# Patient Record
Sex: FEMALE | Race: WHITE
[De-identification: names, ages, dates, MRNs, and addresses within clinical notes are randomized per-mention and may not be internally consistent; named-entity substitution may affect disease eponyms.]

---

## 2017-05-11 NOTE — US
First trimester obstetrical ultrasound: Multiple real-time images were

 obtained transabdominally.

 

Comparison: No previous studies available.

 

Dates:

LMP: LMP given as 2/22/17, SHANDRA 11/29/17, gestational age 11 weeks 1 

day

Current ultrasound: SHANDRA 11/25/17, gestational age 11 weeks 5 days

 

Single intrauterine gestation is seen.  Amniotic fluid volume is 

normal.  Embryo is noted.  Right and left ovaries are unremarkable.  

Clump of material next to the gestational sac most likely representing

 blood clot within the lower uterine segment likely representing 

subchorionic hemorrhage which occurs near the internal cervical os.

 

Maternal adnexa: Unremarkable

 

Measurements:

Crown-rump length: 49.58 mm - 11 weeks 5 days

Heart rate: 167 BPM

Cervical length: 3.7 cm

 

Impression:

1.  Findings felt compatible with subchorionic hemorrhage/blood clot 

within the lower uterine segment next to the internal cervical os.

2.  Single intrauterine gestation is seen with dates as noted above.

3.  Normal heart activity is seen at this time.

 

Diagnostic code #3

## 2017-11-27 NOTE — PCM.LDHP
L&D History of Present Illness





- General


Date of Service: 17


Admit Problem/Dx: 


 Patient Status Order with Admit Dx/Problem





17 07:30


Patient Status [ADT] Routine 








 Admission Diagnosis/Problem











Admission Diagnosis/Problem    Normal labor














Source of Information: Patient


History Limitations: Reports: No Limitations





- History of Present Illness


Introduction:: 





patient is a 27-year-old  at 39-5/7 weeks gestation who presentsday in 

labor.  States that se has been lana irregularly on and off for the 

pastbut these became consistent about 2:00 this morning.  Denies any loss of 

fluid.  





- Related Data


Allergies/Adverse Reactions: 


 Allergies











Allergy/AdvReac Type Severity Reaction Status Date / Time


 


No Known Allergies Allergy   Verified 17 17:19











Home Medications: 


 Home Meds





L.acidoph,Paracasei, B.lactis [Probiotic] 1 tab PO DAILY 17 [History]


Multivitamin [Multivitamins] 1 tab PO DAILY 17 [History]


Propranolol [Inderal] 10 mg PO BID 17 [History]











Past Medical History


OB/GYN History: Reports: Pregnancy


: 3


Para: 2


LMP (Approximate): Pregnant


Neurological History: Reports: Headaches, Chronic





- Past Surgical History


HEENT Surgical History: Reports: Tonsillectomy


GI Surgical History: Reports: Cholecystectomy, Colonoscopy, EGD


Female  Surgical History: Reports:  Section





Social & Family History





- Tobacco Use


Smoking Status *Q: Never Smoker





- Caffeine Use


Caffeine Use: Reports: Coffee





- Alcohol Use


Alcohol Use History: No





- Recreational Drug Use


Recreational Drug Use: No





H&P Review of Systems





- Review of Systems:


Review Of Systems: See Below


General: Reports: No Symptoms


Pulmonary: Reports: No Symptoms


Cardiovascular: Reports: No Symptoms


Gastrointestinal: Reports: Abdominal Pain (contractions)


Genitourinary: Reports: No Symptoms


Musculoskeletal: Reports: No Symptoms


Psychiatric: Reports: No Symptoms





L&D Exam





- Exam


Exam: See Below





- OB Specific


Contraction Intensity: Moderate to Strong


Fetal Movement: Active


Fetal Heart Tones: Present


Fetal Heart Tones per Min: 135


Fetal Heart Rate (FHR) Variability: Moderate (6-25 bmp)


Birth Presentation: Vertex





- Garcia Score


Garcia Score Cervix Position: Midposition


Garcia Score Effacement: >80%


Garcia Score Dilation: > 5 cm


Garcia Score Infant's Station: -2





- Exam


General: Alert, Oriented, Cooperative


Lungs: Clear to Auscultation, Normal Respiratory Effort


Cardiovascular: Regular Rate, Regular Rhythm


GI/Abdominal Exam: Soft, Non-Tender


Genitourinary: Normal external exam


Extremities: Normal Inspection


Skin: Warm, Dry, Intact





- Patient Data


Result Diagrams: 


 17 07:51








- Problem List


(1) 39 weeks gestation of pregnancy


SNOMED Code(s): 51124514


   ICD Code: Z3A.39 - 39 WEEKS GESTATION OF PREGNANCY   Status: Acute   Current 

Visit: Yes   





(2) History of 


SNOMED Code(s): 154119467


   ICD Code: Z98.891 - HISTORY OF UTERINE SCAR FROM PREVIOUS SURGERY   Status: 

Acute   Current Visit: Yes   





(3) History of 


SNOMED Code(s): 796022506


   ICD Code: Z98.891 - HISTORY OF UTERINE SCAR FROM PREVIOUS SURGERY   Status: 

Acute   Current Visit: Yes   





(4) Normal labor


SNOMED Code(s): 41840955


   ICD Code: O80 - ENCOUNTER FOR FULL-TERM UNCOMPLICATED DELIVERY; Z37.9 - 

OUTCOME OF DELIVERY, UNSPECIFIED   Status: Acute   Current Visit: Yes   





(5) Desires  (vaginal birth after ) trial


SNOMED Code(s): 987571282


   ICD Code: O34.219 - MATERNAL CARE FOR UNSP TYPE SCAR FROM PREVIOUS  

DEL   Status: Acute   Current Visit: Yes   


Problem List Initiated/Reviewed/Updated: Yes


Orders Last 24hrs: 


 Active Orders 24 hr











 Category Date Time Status


 


 Patient Status [ADT] Routine ADT  17 07:30 Ordered


 


 Activity as Tolerated [RC] PFP Care  17 07:30 Ordered


 


 Communication Order [RC] ASDIRECTED Care  17 07:30 Ordered


 


 Fetal Heart Tones [RC] ASDIRECTED Care  17 07:30 Ordered


 


 Notify Provider [RC] PFP Care  17 07:30 Ordered


 


 Notify Provider [RC] PRN Care  17 07:30 Ordered


 


 Peripheral IV Care [RC] .AS DIRECTED Care  17 07:30 Ordered


 


 Vital Signs [RC] PER UNIT ROUTINE Care  17 07:30 Ordered


 


 Clear Liquid Diet [DIET] Diet  17 Breakfast Ordered


 


 CBC W/O DIFF,HEMOGRAM [HEME] Stat Lab  17 07:29 Ordered


 


 TYPE AND SCREEN [BBK] Stat Lab  17 07:29 Ordered


 


 Lactated Ringers [Ringers, Lactated] 1,000 ml Med  17 07:30 Ordered





 IV ASDIRECTED   


 


 Nalbuphine [Nubain] Med  17 07:29 Ordered





 10 mg IVPUSH Q2H PRN   


 


 Ondansetron [Zofran] Med  17 07:29 Ordered





 4 mg IVPUSH Q4H PRN   


 


 Oxytocin/Lactated Ringers [Pitocin in LR 10 Units/1,000 Med  17 07:30 

Ordered





 ML]   





 10 unit in 1,000 ml IV .CONTINUOUS   


 


 Sodium Chloride 0.9% [Saline Flush] Med  17 07:29 Ordered





 10 ml FLUSH ASDIRECTED PRN   


 


 Electronic Fetal Heart Tones Ext w TOCO [WOMSER] Oth  17 07:30 Ordered





 Routine   


 


 Electronic Fetal Heart Tones Internal [WOMSER] Per Unit Oth  17 07:30 

Ordered





 Routine   


 


 Peripheral IV Insertion Adult [OM.PC] Routine Oth  17 07:30 Ordered


 


 Resuscitation Status Routine Resus Stat  17 07:29 Ordered











Assessment/Plan Comment:: 





27-year-old  at 39-5/7 weeks gestation presents in labor.  She has a 

history of a  for breech and then a successful .  Does desire  

again this pregnancy,





* CBC and type and screen


* GBS negative, no need for antibiotics


* pain management per patient preference


* Previously counseled on risks and benefits of 


* Anticipate vaginal delivery

## 2017-11-27 NOTE — PCM.PREANE
Preanesthetic Assessment





- Anesthesia/Transfusion/Family Hx


Anesthesia History: Prior Anesthesia Without Reaction


Family History of Anesthesia Reaction: No


Transfusion History: No Prior Transfusion(s)





- Review of Systems


General: No Symptoms


Pulmonary: No Symptoms


Cardiovascular: No Symptoms


Gastrointestinal: No Symptoms


Neurological: No Symptoms


Other: Reports: None





- Physical Assessment


Pulse: 96


O2 Sat by Pulse Oximetry: 97


Respiratory Rate: 22


Blood Pressure: 145/60


Temperature: 36.3 C


Height: 1.65 m


Weight: 118.887 kg


ASA Class: 2


Mental Status: Alert & Oriented x3


Airway Class: Mallampati = 1


Dentition: Reports: Normal Dentition


Thyro-Mental Finger Breadths: 3


Mouth Opening Finger Breadths: 3


ROM/Head Extension: Full


Lungs: Clear to Auscultation, Normal Respiratory Effort


Cardiovascular: Regular Rate, Regular Rhythm





- Lab


Values: 





 Laboratory Last Values











WBC  11.48 K/mm3 (3.98-10.04)  H  17  07:51    


 


RBC  4.84 M/mm3 (3.98-5.22)   17  07:51    


 


Hgb  13.4 gm/L (11.2-15.7)   17  07:51    


 


Hct  39.5 % (34.1-44.9)   17  07:51    


 


MCV  81.6 fl (79.4-94.8)   17  07:51    


 


MCH  27.7 pg (25.6-32.2)   17  07:51    


 


MCHC  33.9 g/dl (32.2-35.5)   17  07:51    


 


RDW Std Deviation  43.1 fL (36.4-46.3)   17  07:51    


 


Plt Count  219 K/mm3 (182-369)   17  07:51    


 


MPV  11.3 fl (9.4-12.3)   17  07:51    














- Allergies


Allergies/Adverse Reactions: 


 Allergies











Allergy/AdvReac Type Severity Reaction Status Date / Time


 


No Known Allergies Allergy   Verified 17 17:19














- Anesthesia Plan


Pre-Op Medication Ordered: None





- Acknowledgements


Anesthesia Type Planned: Epidural


Pt an Appropriate Candidate for the Planned Anesthesia: Yes


Alternatives and Risks of Anesthesia Discussed w Pt/Guardian: Yes


Pt/Guardian Understands and Agrees with Anesthesia Plan: Yes





PreAnesthesia Questionnaire


Gastrointestinal History: Reports: GERD


OB/GYN History: Reports: Pregnancy


Other OB/BYN History: 


Neurological History: Reports: Headaches, Chronic





- Past Surgical History


HEENT Surgical History: Reports: Tonsillectomy


GI Surgical History: Reports: Cholecystectomy, Colonoscopy, EGD


Female  Surgical History: Reports:  Section





- SUBSTANCE USE


Smoking Status *Q: Never Smoker


Recreational Drug Use History: No





- HOME MEDS


Home Medications: 


 Home Meds





L.acidoph,Paracasei, B.lactis [Probiotic] 1 tab PO DAILY 17 [History]


Multivitamin [Multivitamins] 1 tab PO DAILY 17 [History]


Propranolol [Inderal] 10 mg PO BID 17 [History]











- CURRENT (IN HOUSE) MEDS


Current Meds: 





 Current Medications





Diphenhydramine HCl (Benadryl)  25 mg IVPUSH Q6H PRN


   PRN Reason: Itching


Ephedrine Sulfate (Ephedrine Sulfate)  5 mg IVPUSH ASDIRECTED PRN


   PRN Reason: HYPOTENTSION


Fentanyl (Sublimaze)  100 mcg EPIDUR Q3H PRN


   PRN Reason: PAIN


Fentanyl/Bupivacaine HCl (Fentanyl/Bupivacaine/Ns 2 Mcg-0.125% 100 Ml)  100 ml 

EPIDUR ASDIRECTED GENESIS


Lactated Ringer's (Ringers, Lactated)  1,000 mls @ 100 mls/hr IV ASDIRECTED GENESIS


   Last Admin: 17 08:10 Dose:  999 mls/hr


Oxytocin/Lactated Ringer's (Pitocin In Lr 10 Units/1,000 Ml)  10 unit in 1,000 

mls @ 500 mls/hr IV .CONTINUOUS GENESIS


Nalbuphine HCl (Nubain)  10 mg IVPUSH Q2H PRN


   PRN Reason: Pain (moderate 4-6)


Ondansetron HCl (Zofran)  4 mg IVPUSH Q4H PRN


   PRN Reason: Nausea/Vomiting


Sodium Chloride (Saline Flush)  10 ml FLUSH ASDIRECTED PRN


   PRN Reason: Keep Vein Open

## 2017-11-27 NOTE — PCM.DEL
L & D Note





- General Info


Date of Service: 17





- Delivery Note


Labor: Spontaneous


Delivery Outcome: Livebirth


Infant Delivery Method: Spontaneous Vaginal Delivery-Single


Infant Delivery Mode: Spontaneous


Birth Presentation: Right Occiput Anterior (KEO)


Nuchal Cord: None


Anesthesia Type: Epidural


Amniotic Fluid Description: Clear


Episiotomy Type: None


Laceration: 2nd Degree, Perineal


Suture type: Vicryl


Suture size: 2-0


Placenta: Intact, Spontaneous


Cord: 3 Vessels


Estimated Blood Loss: 350


Resuscitation Needed: Yes


[place]: Bulb Syringe, Stimulated, Warmed, [place] Used


Delivery Comments (Free Text/Narrative):: 


Patient found to be complete and began pushing.  With maternal pushing effort 

fetal head delivered from an KEO presentation.  No nuchal cord present.  With 

gentle traction the fetal shoulders and body delivered.  Infant placed on 

maternal abdomen.  Cord clamped and cut.  Cord blood obtained.  Placenta 

allowed time to separate and spontaneously expelled.  Inspection of the 

perineum showed a second-degree laceration which was repaired with a 2-0 Vicryl 

in the typical fashion.








- Patient Data


Vitals - Most Recent: 


 Last Vital Signs











Temp  36.3 C   17 09:01


 


Pulse  96   17 09:01


 


Resp  22 H  17 09:01


 


BP  145/60 H  17 09:01


 


Pulse Ox  97   17 09:01











Weight - Most Recent: 118.887 kg


Lab Results Last 24 Hours: 


 Laboratory Results - last 24 hr











  17 Range/Units





  07:51 08:24 


 


WBC  11.48 H   (3.98-10.04)  K/mm3


 


RBC  4.84   (3.98-5.22)  M/mm3


 


Hgb  13.4   (11.2-15.7)  gm/L


 


Hct  39.5   (34.1-44.9)  %


 


MCV  81.6   (79.4-94.8)  fl


 


MCH  27.7   (25.6-32.2)  pg


 


MCHC  33.9   (32.2-35.5)  g/dl


 


RDW Std Deviation  43.1   (36.4-46.3)  fL


 


Plt Count  219   (182-369)  K/mm3


 


MPV  11.3   (9.4-12.3)  fl


 


Blood Type   AB POSITIVE  


 


Gel Antibody Screen   Negative  











Med Orders - Current: 


 Current Medications





Diphenhydramine HCl (Benadryl)  25 mg IVPUSH Q6H PRN


   PRN Reason: Itching


Ephedrine Sulfate (Ephedrine Sulfate)  5 mg IVPUSH ASDIRECTED PRN


   PRN Reason: HYPOTENTSION


Fentanyl (Sublimaze)  100 mcg EPIDUR Q3H PRN


   PRN Reason: PAIN


   Last Admin: 17 09:16 Dose:  100 mcg


Fentanyl/Bupivacaine HCl (Fentanyl/Bupivacaine/Ns 2 Mcg-0.125% 100 Ml)  100 ml 

EPIDUR ASDIRECTED GENESIS


   Last Admin: 17 09:15 Dose:  100 ml


Lactated Ringer's (Ringers, Lactated)  1,000 mls @ 100 mls/hr IV ASDIRECTED GENESIS


   Last Admin: 17 09:10 Dose:  125 mls/hr


Oxytocin/Lactated Ringer's (Pitocin In Lr 10 Units/1,000 Ml)  10 unit in 1,000 

mls @ 500 mls/hr IV .CONTINUOUS [facility]


   Last Admin: 17 11:23 Dose:  999 mls/hr


Nalbuphine HCl (Nubain)  10 mg IVPUSH Q2H PRN


   PRN Reason: Pain (moderate 4-6)


Ondansetron HCl (Zofran)  4 mg IVPUSH Q4H PRN


   PRN Reason: Nausea/Vomiting


Sodium Chloride (Saline Flush)  10 ml FLUSH ASDIRECTED PRN


   PRN Reason: Keep Vein Open











- Problem List & Annotations


(1) 39 weeks gestation of pregnancy


SNOMED Code(s): 69039457


   Code(s): Z3A.39 - 39 WEEKS GESTATION OF PREGNANCY   Status: Acute   Current 

Visit: Yes   





(2) History of 


SNOMED Code(s): 782998386


   Code(s): Z98.891 - HISTORY OF UTERINE SCAR FROM PREVIOUS SURGERY   Status: 

Acute   Current Visit: Yes   





(3) History of 


SNOMED Code(s): 074269085


   Code(s): Z98.891 - HISTORY OF UTERINE SCAR FROM PREVIOUS SURGERY   Status: 

Acute   Current Visit: Yes   





(4) Normal labor


SNOMED Code(s): 66508395


   Code(s): O80 - ENCOUNTER FOR FULL-TERM UNCOMPLICATED DELIVERY; Z37.9 - 

OUTCOME OF DELIVERY, UNSPECIFIED   Status: Acute   Current Visit: Yes   





(5) Desires  (vaginal birth after ) trial


SNOMED Code(s): 597184838


   Code(s): O34.219 - MATERNAL CARE FOR UNSP TYPE SCAR FROM PREVIOUS  

DEL   Status: Acute   Current Visit: Yes   





(6) , delivered, current hospitalization


SNOMED Code(s): 095923208


   Code(s): O34.219 - MATERNAL CARE FOR UNSP TYPE SCAR FROM PREVIOUS  

DEL   Status: Acute   Current Visit: Yes   





- Problem List Review


Problem List Initiated/Reviewed/Updated: Yes





- My Orders


Last 24 Hours: 


My Active Orders





17 07:29


Nalbuphine [Nubain]   10 mg IVPUSH Q2H PRN 


Ondansetron [Zofran]   4 mg IVPUSH Q4H PRN 


Sodium Chloride 0.9% [Saline Flush]   10 ml FLUSH ASDIRECTED PRN 


Resuscitation Status Routine 





17 07:30


Patient Status [ADT] Routine 


Activity as Tolerated [RC] PFP 


Communication Order [RC] ASDIRECTED 


Fetal Heart Tones [RC] ASDIRECTED 


Notify Provider [RC] PFP 


Notify Provider [RC] PRN 


Vital Signs [RC] PER UNIT ROUTINE 


Lactated Ringers [Ringers, Lactated] 1,000 ml IV ASDIRECTED 


Oxytocin/Lactated Ringers [Pitocin in LR 10 Units/1,000 ML] 10 unit in 1,000 ml 

IV .CONTINUOUS 


Electronic Fetal Heart Tones Ext w TOCO [WOMSER] Routine 


Electronic Fetal Heart Tones Internal [WOMSER] Per Unit Routine 


Peripheral IV Insertion Adult [OM.PC] Routine 





17 Breakfast


Clear Liquid Diet [DIET] 














- Assessment


Assessment:: 





26 y/o G3 now  PPD#0 from  at 39 5/7 wks 





- Plan


Plan:: 





/


* Routine cares


* Encourage breast feeding


* Discharge home in 1-2 days

## 2017-11-28 NOTE — PCM.[email or web address]
**Discharge Summary





- Discharge Data


Discharge Date: 17


Discharge Disposition: Home, Self-Care 01


Condition: Good





- Discharge Diagnosis/Problem(s)


(1) 39 weeks gestation of pregnancy


SNOMED Code(s): 73012866


   ICD Code: Z3A.39 - 39 WEEKS GESTATION OF PREGNANCY   Status: Acute   





(2) History of 


SNOMED Code(s): 324096302


   ICD Code: Z98.891 - HISTORY OF UTERINE SCAR FROM PREVIOUS SURGERY   Status: 

Acute   





(3) History of 


SNOMED Code(s): 173206462


   ICD Code: Z98.891 - HISTORY OF UTERINE SCAR FROM PREVIOUS SURGERY   Status: 

Acute   





(4) Normal labor


SNOMED Code(s): 43586502


   ICD Code: O80 - ENCOUNTER FOR FULL-TERM UNCOMPLICATED DELIVERY; Z37.9 - 

OUTCOME OF DELIVERY, UNSPECIFIED   Status: Acute   





(5) Desires  (vaginal birth after ) trial


SNOMED Code(s): 534897981


   ICD Code: O34.219 - MATERNAL CARE FOR UNSP TYPE SCAR FROM PREVIOUS  

DEL   Status: Acute   





(6) , delivered, current hospitalization


SNOMED Code(s): 323760922


   ICD Code: O34.219 - MATERNAL CARE FOR UNSP TYPE SCAR FROM PREVIOUS  

DEL   Status: Acute   





- Patient Summary/Data


Complications: None


Consults: 


None


Recommended Follow-up Testing/Procedures: 


Follow up in 5-6 weeks for postpartum check 


Hospital Course: 





28 y/o  presented at 39 5/7 wks in labor.  She progressed well to 

complete dilation without need for augmentation.  She underwent an 

uncomplicated .  See delivery note.  Postpartum she did well and was 

discharged home on PPD#1





- Patient Instructions


Diet: Regular Diet as Tolerated


Activity: As Tolerated


Activity, Other: Pelvic Rest for 6 weeks


Driving: May Drive Today


Showering/Bathing: May Shower


Showering/Bathing, Other: May Bathe


Notify Provider of: Fever, Increased Pain, Swelling and Redness, Drainage, 

Nausea and/or Vomiting





- Discharge Plan


Home Medications: 


 Home Meds





L.acidoph,Paracasei, B.lactis [Probiotic] 1 tab PO DAILY 17 [History]


Pnv No.122/Iron/Folic Acid [Prenatal Multi Tablet] 1 each PO DAILY 17 [

History]


Docusate Sodium [Colace] 100 mg PO BID PRN  cap 17 [Rx]


Ibuprofen [IJD: Ibuprofen] 600 mg PO Q6H PRN  tablet 17 [Rx]








Patient Handouts:  Home Care Instructions for Mom


Referrals: 


Denisha Osei MD [Primary Care Provider] -  (5-6 weeks for postpartum check)





- Discharge Summary/Plan Comment


DC Time >30 min.: No





- Patient Data


Vitals - Most Recent: 


 Last Vital Signs











Temp  36.7 C   17 03:52


 


Pulse  84   17 03:52


 


Resp  18   17 03:52


 


BP  126/65   17 03:52


 


Pulse Ox  97   17 09:01











Weight - Most Recent: 118.887 kg


Lab Results - Last 24 hrs: 


 Laboratory Results - last 24 hr











  17 Range/Units





  07:51 08:24 


 


WBC  11.48 H   (3.98-10.04)  K/mm3


 


RBC  4.84   (3.98-5.22)  M/mm3


 


Hgb  13.4   (11.2-15.7)  gm/L


 


Hct  39.5   (34.1-44.9)  %


 


MCV  81.6   (79.4-94.8)  fl


 


MCH  27.7   (25.6-32.2)  pg


 


MCHC  33.9   (32.2-35.5)  g/dl


 


RDW Std Deviation  43.1   (36.4-46.3)  fL


 


Plt Count  219   (182-369)  K/mm3


 


MPV  11.3   (9.4-12.3)  fl


 


Blood Type   AB POSITIVE  


 


Gel Antibody Screen   Negative  











Med Orders - Current: 


 Current Medications





Acetaminophen (Tylenol)  650 mg PO Q4H PRN


   PRN Reason: mild pain or fever


Benzocaine/Menthol (Dermoplast Pain Relief Spray)  0 gm TOP ASDIRECTED PRN


   PRN Reason: Perineal Comfort Measure


   Last Admin: 17 13:07 Dose:  1 can


Docusate Sodium (Colace)  100 mg PO BID PRN


   PRN Reason: Constipation


   Last Admin: 17 13:07 Dose:  100 mg


Emollient Ointment (Lansinoh Hpa)  0 gm TOP ASDIRECTED PRN


   PRN Reason: Sore Nipples


Ibuprofen (Motrin)  600 mg PO Q6H PRN


   PRN Reason: Mild pain or fever


   Last Admin: 17 01:41 Dose:  600 mg


Witch Hazel (Tucks)  1 pad TOP ASDIRECTED PRN


   PRN Reason: Hemorrhoid pain


   Last Admin: 17 13:07 Dose:  1 container





Discontinued Medications





Diphenhydramine HCl (Benadryl)  25 mg IVPUSH Q6H PRN


   PRN Reason: Itching


Ephedrine Sulfate (Ephedrine Sulfate)  5 mg IVPUSH ASDIRECTED PRN


   PRN Reason: HYPOTENTSION


Fentanyl (Sublimaze)  100 mcg EPIDUR Q3H PRN


   PRN Reason: PAIN


   Last Admin: 17 09:16 Dose:  100 mcg


Fentanyl/Bupivacaine HCl (Fentanyl/Bupivacaine/Ns 2 Mcg-0.125% 100 Ml)  100 ml 

EPIDUR ASDIRECTED GENESIS


   Last Admin: 17 09:15 Dose:  100 ml


Lactated Ringer's (Ringers, Lactated)  1,000 mls @ 100 mls/hr IV ASDIRECTED GENESIS


   Last Admin: 17 09:10 Dose:  125 mls/hr


Oxytocin/Lactated Ringer's (Pitocin In Lr 10 Units/1,000 Ml)  10 unit in 1,000 

mls @ 500 mls/hr IV .CONTINUOUS GENESIS


   Last Admin: 17 11:23 Dose:  999 mls/hr


Nalbuphine HCl (Nubain)  10 mg IVPUSH Q2H PRN


   PRN Reason: Pain (moderate 4-6)


Ondansetron HCl (Zofran)  4 mg IVPUSH Q4H PRN


   PRN Reason: Nausea/Vomiting


Sodium Chloride (Saline Flush)  10 ml FLUSH ASDIRECTED PRN


   PRN Reason: Keep Vein Open











*Q Meaningful Use (DIS)





- VTE *Q


VTE Criteria *Q: 








- Stroke *Q


Stroke Criteria *Q: 








- AMI *Q


AMI Criteria *Q:

## 2017-11-28 NOTE — PCM.PNPP
- General Info


Date of Service: 17


Functional Status: Reports: Pain Controlled, Tolerating Diet, Ambulating, 

Urinating





- Review of Systems


General: Reports: No Symptoms


Pulmonary: Reports: No Symptoms


Cardiovascular: Reports: No Symptoms


Gastrointestinal: Reports: No Symptoms


Genitourinary: Reports: No Symptoms


Musculoskeletal: Reports: No Symptoms





- Patient Data


Vital Signs - Most Recent: 


 Last Vital Signs











Temp  36.7 C   17 03:52


 


Pulse  84   17 03:52


 


Resp  18   17 03:52


 


BP  126/65   17 03:52


 


Pulse Ox  97   17 09:01











Weight - Most Recent: 118.887 kg


Lab Results - Last 24 Hours: 


 Laboratory Results - last 24 hr











  17 Range/Units





  07:51 08:24 


 


WBC  11.48 H   (3.98-10.04)  K/mm3


 


RBC  4.84   (3.98-5.22)  M/mm3


 


Hgb  13.4   (11.2-15.7)  gm/L


 


Hct  39.5   (34.1-44.9)  %


 


MCV  81.6   (79.4-94.8)  fl


 


MCH  27.7   (25.6-32.2)  pg


 


MCHC  33.9   (32.2-35.5)  g/dl


 


RDW Std Deviation  43.1   (36.4-46.3)  fL


 


Plt Count  219   (182-369)  K/mm3


 


MPV  11.3   (9.4-12.3)  fl


 


Blood Type   AB POSITIVE  


 


Gel Antibody Screen   Negative  











Med Orders - Current: 


 Current Medications





Acetaminophen (Tylenol)  650 mg PO Q4H PRN


   PRN Reason: mild pain or fever


Benzocaine/Menthol (Dermoplast Pain Relief Spray)  0 gm TOP ASDIRECTED PRN


   PRN Reason: Perineal Comfort Measure


   Last Admin: 17 13:07 Dose:  1 can


Docusate Sodium (Colace)  100 mg PO BID PRN


   PRN Reason: Constipation


   Last Admin: 17 13:07 Dose:  100 mg


Emollient Ointment (Lansinoh Hpa)  0 gm TOP ASDIRECTED PRN


   PRN Reason: Sore Nipples


Ibuprofen (Motrin)  600 mg PO Q6H PRN


   PRN Reason: Mild pain or fever


   Last Admin: 17 01:41 Dose:  600 mg


Witch Hazel (Tucks)  1 pad TOP ASDIRECTED PRN


   PRN Reason: Hemorrhoid pain


   Last Admin: 17 13:07 Dose:  1 container





Discontinued Medications





Diphenhydramine HCl (Benadryl)  25 mg IVPUSH Q6H PRN


   PRN Reason: Itching


Ephedrine Sulfate (Ephedrine Sulfate)  5 mg IVPUSH ASDIRECTED PRN


   PRN Reason: HYPOTENTSION


Fentanyl (Sublimaze)  100 mcg EPIDUR Q3H PRN


   PRN Reason: PAIN


   Last Admin: 17 09:16 Dose:  100 mcg


Fentanyl/Bupivacaine HCl (Fentanyl/Bupivacaine/Ns 2 Mcg-0.125% 100 Ml)  100 ml 

EPIDUR ASDIRECTED GENESIS


   Last Admin: 17 09:15 Dose:  100 ml


Lactated Ringer's (Ringers, Lactated)  1,000 mls @ 100 mls/hr IV ASDIRECTED GENESIS


   Last Admin: 17 09:10 Dose:  125 mls/hr


Oxytocin/Lactated Ringer's (Pitocin In Lr 10 Units/1,000 Ml)  10 unit in 1,000 

mls @ 500 mls/hr IV .CONTINUOUS GENESIS


   Last Admin: 17 11:23 Dose:  999 mls/hr


Nalbuphine HCl (Nubain)  10 mg IVPUSH Q2H PRN


   PRN Reason: Pain (moderate 4-6)


Ondansetron HCl (Zofran)  4 mg IVPUSH Q4H PRN


   PRN Reason: Nausea/Vomiting


Sodium Chloride (Saline Flush)  10 ml FLUSH ASDIRECTED PRN


   PRN Reason: Keep Vein Open











- Infant Interaction


Infant Disposition, Postpartum: [place] in Room with Family


Infant Interaction: Holding Infant


Infant Feeding:  Infant; Nursed Well


Support Person: 





- Postpartum Recovery Exam


Fundal Tone: Firm


Fundal Level: At Umbilicus


Fundal Placement: Right


Lochia Amount: None, Moderate


Lochia Color: Rubra/Red


Perineum Description: Intact, Minimal Bruising/Swelling


Other Perinuem Description: 2nd degree laceration with repair


Episiotomy/Laceration: Approximated


Bladder Status: Voiding


Urinary Elimination: Voided





- Exam


General: Alert, Oriented, Cooperative


GI/Abdominal Exam: Soft, Non-Tender


Extremities: Normal Inspection


Skin: Warm, Dry, Intact





- Problem List & Annotations


(1) 39 weeks gestation of pregnancy


SNOMED Code(s): 94205583


   Code(s): Z3A.39 - 39 WEEKS GESTATION OF PREGNANCY   Status: Acute   





(2) History of 


SNOMED Code(s): 993339224


   Code(s): Z98.891 - HISTORY OF UTERINE SCAR FROM PREVIOUS SURGERY   Status: 

Acute   





(3) History of 


SNOMED Code(s): 858930250


   Code(s): Z98.891 - HISTORY OF UTERINE SCAR FROM PREVIOUS SURGERY   Status: 

Acute   





(4) Normal labor


SNOMED Code(s): 63774632


   Code(s): O80 - ENCOUNTER FOR FULL-TERM UNCOMPLICATED DELIVERY; Z37.9 - 

OUTCOME OF DELIVERY, UNSPECIFIED   Status: Acute   





(5) Desires  (vaginal birth after ) trial


SNOMED Code(s): 590041392


   Code(s): O34.219 - MATERNAL CARE FOR UNSP TYPE SCAR FROM PREVIOUS  

DEL   Status: Acute   





(6) , delivered, current hospitalization


SNOMED Code(s): 709591358


   Code(s): O34.219 - MATERNAL CARE FOR UNSP TYPE SCAR FROM PREVIOUS  

DEL   Status: Acute   





- Problem List Review


Problem List Initiated/Reviewed/Updated: Yes





- My Orders


Last 24 Hours: 


My Active Orders





17 07:29


Resuscitation Status Routine 





17 07:30


Fetal Heart Tones [RC] ASDIRECTED 





17 12:56


Activity as Tolerated [RC] PER UNIT ROUTINE 


Vital Signs [RC] ASDIRECTED 


Acetaminophen [Tylenol]   650 mg PO Q4H PRN 


Benzocaine/Menthol [Dermoplast Pain Relief Spray]   See Dose Instructions  TOP 

ASDIRECTED PRN 


Docusate Sodium [Colace]   100 mg PO BID PRN 


Ibuprofen [Motrin]   600 mg PO Q6H PRN 


Lanolin [Lansinoh HPA]   See Dose Instructions  TOP ASDIRECTED PRN 


Witch Hazel [Tucks]   1 pad TOP ASDIRECTED PRN 


Assess Lochia [WOMSER] Per Unit Routine 


Assess Uterine Involution [WOMSER] Per Unit Routine 


Breast Pump [WOMSER] Per Unit Routine 


Heat Therapy [OM.PC] PRN 


Ice Therapy [OM.PC] Per Unit Routine 


Perineal Care [OM.PC] Per Unit Routine 


Peripheral IV Discontinue [OM.PC] Routine 


Sitz Bath [OM.PC] Per Unit Routine 





17 Lunch


Regular Diet [DIET] 





17 12:56


Heat Therapy [OM.PC] PRN 














- Assessment


Assessment:: 





28 y/o G3 now  PPD#1 from  at 39 5/7 wks 





- Plan


Plan:: 





/


* Routine cares


* Encourage breast feeding


* Discharge home today

## 2019-09-13 ENCOUNTER — HOSPITAL ENCOUNTER (INPATIENT)
Dept: HOSPITAL 41 - JD.OBCHECK | Age: 29
LOS: 1 days | Discharge: HOME | DRG: 560 | End: 2019-09-14
Attending: OBSTETRICS & GYNECOLOGY | Admitting: OBSTETRICS & GYNECOLOGY
Payer: COMMERCIAL

## 2019-09-13 DIAGNOSIS — Z3A.40: ICD-10-CM

## 2019-09-13 DIAGNOSIS — O34.211: Primary | ICD-10-CM

## 2019-09-13 DIAGNOSIS — O48.0: ICD-10-CM

## 2019-09-13 NOTE — PCM.LDHP
L&D History of Present Illness





- General


Date of Service: 19


Admit Problem/Dx: 


 Patient Status Order with Admit Dx/Problem





19 06:23


Patient Status [ADT] Routine 








 Admission Diagnosis/Problem











Admission Diagnosis/Problem    Pregnancy














Source of Information: Patient


History Limitations: Reports: No Limitations





- History of Present Illness


Introduction:: 





Patient is a 30 y/o  at 40 0/7 wks who presents in active labor.  

Contractions started about 10 PM last night.  No other issues 





- Related Data


Allergies/Adverse Reactions: 


 Allergies











Allergy/AdvReac Type Severity Reaction Status Date / Time


 


No Known Allergies Allergy   Verified 19 18:06











Home Medications: 


 Home Meds





PNV95/Ferrous Fumarate/FA [Prenatal Tablet] 1 tab PO DAILY 19 [History]











Past Medical History


Gastrointestinal History: Reports: GERD


OB/GYN History: Reports: Pregnancy


: 4


Para: 3


LMP (Approximate): Pregnant


Neurological History: Reports: Headaches, Chronic


Psychiatric History: Reports: Anxiety, Depression





- Past Surgical History


HEENT Surgical History: Reports: Tonsillectomy


GI Surgical History: Reports: Cholecystectomy, Colonoscopy, EGD


Female  Surgical History: Reports:  Section





Social & Family History





- Family History


Family Medical History: Noncontributory





- Tobacco Use


Smoking Status *Q: Never Smoker





- Caffeine Use


Caffeine Use: Reports: Coffee


Other Caffeine Use: Daily





- Alcohol Use


Alcohol Use History: No





- Recreational Drug Use


Recreational Drug Use: No





H&P Review of Systems





- Review of Systems:


Review Of Systems: See Below


General: Reports: No Symptoms


Pulmonary: Reports: No Symptoms


Cardiovascular: Reports: No Symptoms


Gastrointestinal: Reports: No Symptoms


Genitourinary: Reports: No Symptoms


Musculoskeletal: Reports: No Symptoms


Psychiatric: Reports: No Symptoms


Neurological: Reports: No Symptoms





L&D Exam





- Exam


Exam: See Below





- OB Specific


Contraction Intensity: Mild to Moderate


Fetal Movement: Active


Fetal Heart Tones: Present


Fetal Heart Tones per Min: 120


Fetal Heart Rate (FHR) Variability: Moderate (6-25 bmp)


Birth Presentation: Vertex





- Garcia Score


Garcia Score Cervix Position: Midposition


Garcia Score Consistency: Soft


Garcia Score Effacement: >80%


Garcia Score Dilation: > 5 cm


Garcia Score Infant's Station: -2


Garcia Score Total: 10





- Exam


General: Alert, Oriented, Cooperative


Lungs: Clear to Auscultation, Normal Respiratory Effort


Cardiovascular: Regular Rate, Regular Rhythm


GI/Abdominal Exam: Soft, Non-Tender


Genitourinary: Normal external exam


Extremities: Normal Inspection


Skin: Warm, Dry, Intact





- Patient Data


Lab Results Last 24 hrs: 


 Laboratory Results - last 24 hr











  19 Range/Units





  06:35 


 


WBC  13.86 H  (3.98-10.04)  K/mm3


 


RBC  4.75  (3.98-5.22)  M/mm3


 


Hgb  13.1  (11.2-15.7)  gm/L


 


Hct  40.0  (34.1-44.9)  %


 


MCV  84.2  (79.4-94.8)  fl


 


MCH  27.6  (25.6-32.2)  pg


 


MCHC  32.8  (32.2-35.5)  g/dl


 


RDW Std Deviation  45.0  (36.4-46.3)  fL


 


Plt Count  215  (182-369)  K/mm3


 


MPV  11.6  (9.4-12.3)  fl


 


Neut % (Auto)  79.6 H  (34.0-71.1)  %


 


Lymph % (Auto)  13.3 L  (19.3-51.7)  %


 


Mono % (Auto)  5.6  (4.7-12.5)  %


 


Eos % (Auto)  0.9  (0.7-5.8)  


 


Baso % (Auto)  0.1  (0.1-1.2)  %


 


Neut # (Auto)  11.02 H  (1.56-6.13)  K/mm3


 


Lymph # (Auto)  1.85  (1.18-3.74)  K/mm3


 


Mono # (Auto)  0.77 H  (0.24-0.36)  K/mm3


 


Eos # (Auto)  0.13  (0.04-0.36)  K/mm3


 


Baso # (Auto)  0.02  (0.01-0.08)  K/mm3











Result Diagrams: 


 19 06:35








- Problem List


(1) 40 weeks gestation of pregnancy


SNOMED Code(s): 04619215


   ICD Code: Z3A.40 - 40 WEEKS GESTATION OF PREGNANCY   Status: Acute   Current 

Visit: Yes   





(2) Desires  (vaginal birth after ) trial


SNOMED Code(s): 470643901, 434543407


   ICD Code: O34.219 - MATERNAL CARE FOR UNSP TYPE SCAR FROM PREVIOUS  

DEL   Status: Acute   Current Visit: No   





(3) History of 


SNOMED Code(s): 350566134


   ICD Code: Z98.891 - HISTORY OF UTERINE SCAR FROM PREVIOUS SURGERY   Status: 

Acute   Current Visit: No   


Problem List Initiated/Reviewed/Updated: Yes


Orders Last 24hrs: 


 Active Orders 24 hr











 Category Date Time Status


 


 Patient Status [ADT] Routine ADT  19 06:23 Active


 


 Activity as Tolerated [RC] PFP Care  19 06:23 Active


 


 Communication Order [RC] ASDIRECTED Care  19 06:23 Active


 


 Fetal Heart Tones [RC] ASDIRECTED Care  19 06:24 Active


 


 Fetal Non Stress Test [RC] PER UNIT ROUTINE Care  19 06:23 Active


 


 Notify Provider [RC] PFP Care  19 06:23 Active


 


 Notify Provider [RC] PRN Care  19 06:23 Active


 


 Peripheral IV Care [RC] .AS DIRECTED Care  19 06:24 Active


 


 Vital Signs [RC] PER UNIT ROUTINE Care  19 06:23 Active


 


 Regular Diet [DIET] Diet  19 Breakfast Active


 


 RAPID PLASMA REAGIN,RPR [CHEM] Routine Lab  19 06:35 Received


 


 Lactated Ringers [Ringers, Lactated] 1,000 ml Med  19 06:30 Active





 IV ASDIRECTED   


 


 Nalbuphine [Nubain] Med  19 06:23 Active





 10 mg IVPUSH Q2H PRN   


 


 Oxytocin/Lactated Ringers [Pitocin in LR 10 Units/1,000 Med  19 06:30 

Active





 ML]   





 10 unit in 1,000 ml IV .CONTINUOUS   


 


 Sodium Chloride 0.9% [Saline Flush] Med  19 06:23 Active





 10 ml FLUSH ASDIRECTED PRN   


 


 Electronic Fetal Heart Tones Ext w TOCO [WOMSER] Oth  19 06:23 Ordered





 Routine   


 


 Electronic Fetal Heart Tones Internal [WOMSER] Per Unit Oth  19 06:23 

Ordered





 Routine   


 


 Peripheral IV Insertion Adult [OM.PC] Routine Oth  19 06:23 Ordered


 


 Resuscitation Status Routine Resus Stat  19 06:23 Ordered








 Medication Orders





Lactated Ringer's (Ringers, Lactated)  1,000 mls @ 100 mls/hr IV ASDIRECTED GENESIS


Oxytocin/Lactated Ringer's (Pitocin In Lr 10 Units/1,000 Ml)  10 unit in 1,000 

mls @ 500 mls/hr IV .CONTINUOUS GENESIS; Protocol


Nalbuphine HCl (Nubain)  10 mg IVPUSH Q2H PRN


   PRN Reason: Pain


Sodium Chloride (Saline Flush)  10 ml FLUSH ASDIRECTED PRN


   PRN Reason: Keep Vein Open








Assessment/Plan Comment:: 





30 y/o  at 40 0/7 wks presents in labor





* Labs 


* GBS negative


* Delivery imminent, anticipate

## 2019-09-13 NOTE — PCM.DEL
L & D Note





- General Info


Date of Service: 19





- Delivery Note


Labor: Spontaneous


Delivery Outcome: Livebirth


Infant Delivery Method: Spontaneous Vaginal Delivery-Single


Infant Delivery Mode: Spontaneous


Birth Presentation: Right Occiput Anterior (KEO)


Nuchal Cord: Present, Reduced


Anesthesia Type: None


Amniotic Fluid Description: Clear


Episiotomy Type: None


Laceration: 1st Degree


Placenta: Intact, Spontaneous


Cord: 3 Vessels


Estimated Blood Loss: 100


: Bulb Syringe, Stimulated, Warmed, Sandy Used, Warmer Used


Delivery Comments (Free Text/Narrative):: 





Patient found to be complete and began pushing.  With maternal pushing effort 

fetal head delivered from KEO presentation.  Nuchal cord present and reduced.  

With gentle downward traction the fetal shoulders and body delivered.  Infant 

placed on maternal abdomen.  Cord clamped and cut.  Cord blood obtained.  

Placenta allowed time to separate and expelled intact.  Inspection of the 

perineum showed a 1st degree laceration which was hemostatic and so not 

repaired. 





- General Info


Date of Service: 19





- Patient Data


Weight - Most Recent: 106.594 kg


Lab Results Last 24 Hours: 


 Laboratory Results - last 24 hr











  19 Range/Units





  06:35 


 


WBC  13.86 H  (3.98-10.04)  K/mm3


 


RBC  4.75  (3.98-5.22)  M/mm3


 


Hgb  13.1  (11.2-15.7)  gm/L


 


Hct  40.0  (34.1-44.9)  %


 


MCV  84.2  (79.4-94.8)  fl


 


MCH  27.6  (25.6-32.2)  pg


 


MCHC  32.8  (32.2-35.5)  g/dl


 


RDW Std Deviation  45.0  (36.4-46.3)  fL


 


Plt Count  215  (182-369)  K/mm3


 


MPV  11.6  (9.4-12.3)  fl


 


Neut % (Auto)  79.6 H  (34.0-71.1)  %


 


Lymph % (Auto)  13.3 L  (19.3-51.7)  %


 


Mono % (Auto)  5.6  (4.7-12.5)  %


 


Eos % (Auto)  0.9  (0.7-5.8)  


 


Baso % (Auto)  0.1  (0.1-1.2)  %


 


Neut # (Auto)  11.02 H  (1.56-6.13)  K/mm3


 


Lymph # (Auto)  1.85  (1.18-3.74)  K/mm3


 


Mono # (Auto)  0.77 H  (0.24-0.36)  K/mm3


 


Eos # (Auto)  0.13  (0.04-0.36)  K/mm3


 


Baso # (Auto)  0.02  (0.01-0.08)  K/mm3











Med Orders - Current: 


 Current Medications





Ephedrine Sulfate (Ephedrine Sulfate)  5 mg IVPUSH ASDIRECTED PRN


   PRN Reason: Hypotension


Fentanyl (Sublimaze)  100 mcg EPIDUR Q3H PRN


   PRN Reason: Pain


Fentanyl/Bupivacaine HCl (Fentanyl/Bupivacaine/Ns 2 Mcg-0.125% 100 Ml)  100 ml 

EPIDUR ASDIRECTED GENESIS


Lactated Ringer's (Ringers, Lactated)  1,000 mls @ 100 mls/hr IV ASDIRECTED GENESIS


   Last Admin: 19 06:30 Dose:  900 mls/hr


Oxytocin/Lactated Ringer's (Pitocin In Lr 10 Units/1,000 Ml)  10 unit in 1,000 

mls @ 500 mls/hr IV .CONTINUOUS GENESIS; Protocol


   Last Admin: 19 07:13 Dose:  500 mls/hr


Nalbuphine HCl (Nubain)  10 mg IVPUSH Q2H PRN


   PRN Reason: Pain


Ondansetron HCl (Zofran)  4 mg IVPUSH ONETIME PRN


   PRN Reason: Nausea/Vomiting


Sodium Chloride (Saline Flush)  10 ml FLUSH ASDIRECTED PRN


   PRN Reason: Keep Vein Open











- Problem List & Annotations


(1) 40 weeks gestation of pregnancy


SNOMED Code(s): 68517958


   Code(s): Z3A.40 - 40 WEEKS GESTATION OF PREGNANCY   Status: Acute   Current 

Visit: Yes   





(2) Desires  (vaginal birth after ) trial


SNOMED Code(s): 795394188, 587027998


   Code(s): O34.219 - MATERNAL CARE FOR UNSP TYPE SCAR FROM PREVIOUS  

DEL   Status: Acute   Current Visit: No   





(3) , delivered, current hospitalization


SNOMED Code(s): 428358935


   Code(s): O34.219 - MATERNAL CARE FOR UNSP TYPE SCAR FROM PREVIOUS  

DEL   Status: Acute   Current Visit: No   





- Problem List Review


Problem List Initiated/Reviewed/Updated: Yes





- My Orders


Last 24 Hours: 


My Active Orders





19 06:23


Patient Status [ADT] Routine 


Activity as Tolerated [RC] PFP 


Communication Order [RC] ASDIRECTED 


Fetal Non Stress Test [RC] PER UNIT ROUTINE 


Notify Provider [RC] PFP 


Notify Provider [RC] PRN 


Vital Signs [RC] PER UNIT ROUTINE 


Nalbuphine [Nubain]   10 mg IVPUSH Q2H PRN 


Sodium Chloride 0.9% [Saline Flush]   10 ml FLUSH ASDIRECTED PRN 


Electronic Fetal Heart Tones Ext w TOCO [WOMSER] Routine 


Electronic Fetal Heart Tones Internal [WOMSER] Per Unit Routine 


Peripheral IV Insertion Adult [OM.PC] Routine 


Resuscitation Status Routine 





19 06:24


Fetal Heart Tones [RC] ASDIRECTED 


Peripheral IV Care [RC] .AS DIRECTED 





19 06:30


Lactated Ringers [Ringers, Lactated] 1,000 ml IV ASDIRECTED 


Oxytocin/Lactated Ringers [Pitocin in LR 10 Units/1,000 ML] 10 unit in 1,000 ml 

IV .CONTINUOUS 





19 06:35


RAPID PLASMA REAGIN,RPR [CHEM] Routine 





19 07:56


Patient Status Manage Transfer [TRANSFER] Routine 





19 Breakfast


Regular Diet [DIET] 














- Assessment


Assessment:: 





28 y/o G4 now  PPD#0 from  at 40 0/7 wks 





- Plan


Plan:: 





* Routine cares


* Encourage breast feeding


* Discharge home in 1-2 days

## 2019-09-13 NOTE — PCM.PREANE
Preanesthetic Assessment





- Anesthesia/Transfusion/Family Hx


Anesthesia History: Prior Anesthesia Without Reaction


Family History of Anesthesia Reaction: No


Transfusion History: No Prior Transfusion(s)


Intubation History: Unknown





- Review of Systems


Gastrointestinal: No Symptoms (GERD)


Neurological: Headache (chronic)





- Physical Assessment


NPO Status Date: 19


ASA Class: 2


Mental Status: Alert & Oriented x3





- Lab


Values: 





 Laboratory Last Values











WBC  13.86 K/mm3 (3.98-10.04)  H  19  06:35    


 


RBC  4.75 M/mm3 (3.98-5.22)   19  06:35    


 


Hgb  13.1 gm/L (11.2-15.7)   19  06:35    


 


Hct  40.0 % (34.1-44.9)   19  06:35    


 


MCV  84.2 fl (79.4-94.8)   19  06:35    


 


MCH  27.6 pg (25.6-32.2)   19  06:35    


 


MCHC  32.8 g/dl (32.2-35.5)   19  06:35    


 


RDW Std Deviation  45.0 fL (36.4-46.3)   19  06:35    


 


Plt Count  215 K/mm3 (182-369)   19  06:35    


 


MPV  11.6 fl (9.4-12.3)   19  06:35    


 


Neut % (Auto)  79.6 % (34.0-71.1)  H  19  06:35    


 


Lymph % (Auto)  13.3 % (19.3-51.7)  L  19  06:35    


 


Mono % (Auto)  5.6 % (4.7-12.5)   19  06:35    


 


Eos % (Auto)  0.9  (0.7-5.8)   19  06:35    


 


Baso % (Auto)  0.1 % (0.1-1.2)   19  06:35    


 


Neut # (Auto)  11.02 K/mm3 (1.56-6.13)  H  19  06:35    


 


Lymph # (Auto)  1.85 K/mm3 (1.18-3.74)   19  06:35    


 


Mono # (Auto)  0.77 K/mm3 (0.24-0.36)  H  19  06:35    


 


Eos # (Auto)  0.13 K/mm3 (0.04-0.36)   19  06:35    


 


Baso # (Auto)  0.02 K/mm3 (0.01-0.08)   19  06:35    








Above labs reviewed and noted and within acceptable ranges to proceed with 

epidural.





- Allergies


Allergies/Adverse Reactions: 


 Allergies











Allergy/AdvReac Type Severity Reaction Status Date / Time


 


No Known Allergies Allergy   Verified 19 18:06














- Anesthesia Plan


Pre-Op Medication Ordered: None





- Acknowledgements


Anesthesia Type Planned: Epidural


Pt an Appropriate Candidate for the Planned Anesthesia: Yes


Alternatives and Risks of Anesthesia Discussed w Pt/Guardian: Yes


Pt/Guardian Understands and Agrees with Anesthesia Plan: Yes





PreAnesthesia Questionnaire


Gastrointestinal History: Reports: GERD


OB/GYN History: Reports: Pregnancy


Other OB/BYN History: 


Neurological History: Reports: Headaches, Chronic


Psychiatric History: Reports: Anxiety, Depression, Suicidal Ideation


Endocrine/Metabolic History: Reports: Obesity/BMI 30+





- Past Surgical History


HEENT Surgical History: Reports: Tonsillectomy


GI Surgical History: Reports: Cholecystectomy, Colonoscopy, EGD


Female  Surgical History: Reports:  Section





- HOME MEDS


Home Medications: 


 Home Meds





PNV95/Ferrous Fumarate/FA [Prenatal Tablet] 1 tab PO DAILY 19 [History]











- CURRENT (IN HOUSE) MEDS


Current Meds: 





 Current Medications





Lactated Ringer's (Ringers, Lactated)  1,000 mls @ 100 mls/hr IV ASDIRECTED GENESIS


   Last Admin: 19 06:30 Dose:  900 mls/hr


Oxytocin/Lactated Ringer's (Pitocin In Lr 10 Units/1,000 Ml)  10 unit in 1,000 

mls @ 500 mls/hr IV .CONTINUOUS GENESIS; Protocol


Nalbuphine HCl (Nubain)  10 mg IVPUSH Q2H PRN


   PRN Reason: Pain


Sodium Chloride (Saline Flush)  10 ml FLUSH ASDIRECTED PRN


   PRN Reason: Keep Vein Open

## 2019-09-14 VITALS — HEART RATE: 80 BPM | DIASTOLIC BLOOD PRESSURE: 54 MMHG | SYSTOLIC BLOOD PRESSURE: 115 MMHG

## 2019-09-14 NOTE — PCM.DCSUM1
**Discharge Summary





- Hospital Course


Free Text/Narrative:: 





- Delivery Note


Labor: Spontaneous


Delivery Outcome: Livebirth


Infant Delivery Method: Spontaneous Vaginal Delivery-Single


Infant Delivery Mode: Spontaneous


Birth Presentation: Right Occiput Anterior (KEO)


Nuchal Cord: Present, Reduced


Anesthesia Type: None


Amniotic Fluid Description: Clear


Episiotomy Type: None


Laceration: 1st Degree


Placenta: Intact, Spontaneous


Cord: 3 Vessels


Estimated Blood Loss: 100


: Bulb Syringe, Stimulated, Warmed, [place] Used, Warmer Used


Delivery Comments (Free Text/Narrative):: 





Patient found to be complete and began pushing.  With maternal pushing effort 

fetal head delivered from KEO presentation.  Nuchal cord present and reduced.  

With gentle downward traction the fetal shoulders and body delivered.  Infant 

placed on maternal abdomen.  Cord clamped and cut.  Cord blood obtained.  

Placenta allowed time to separate and expelled intact.  Inspection of the 

perineum showed a 1st degree laceration which was hemostatic and so not 

repaired.


HPI Initial Comments: 





- Delivery Note


Labor: Spontaneous


Delivery Outcome: Livebirth


Infant Delivery Method: Spontaneous Vaginal Delivery-Single


Infant Delivery Mode: Spontaneous


Birth Presentation: Right Occiput Anterior (KEO)


Nuchal Cord: Present, Reduced


Anesthesia Type: None


Amniotic Fluid Description: Clear


Episiotomy Type: None


Laceration: 1st Degree


Placenta: Intact, Spontaneous


Cord: 3 Vessels


Estimated Blood Loss: 100


[place]: Bulb Syringe, Stimulated, Warmed, [place] Used, Warmer Used


Delivery Comments (Free Text/Narrative):: 





Patient found to be complete and began pushing.  With maternal pushing effort 

fetal head delivered from KEO presentation.  Nuchal cord present and reduced.  

With gentle downward traction the fetal shoulders and body delivered.  Infant 

placed on maternal abdomen.  Cord clamped and cut.  Cord blood obtained.  

Placenta allowed time to separate and expelled intact.  Inspection of the 

perineum showed a 1st degree laceration which was hemostatic and so not 

repaired.


Brief History: - Delivery Note.  Labor: Spontaneous.  Delivery Outcome: 

Livebirth.  Infant Delivery Method: Spontaneous Vaginal Delivery-Single.  

Infant Delivery Mode: Spontaneous.  Birth Presentation: Right Occiput Anterior (

KEO).  Nuchal Cord: Present, Reduced.  Anesthesia Type: None.  Amniotic Fluid 

Description: Clear.  Episiotomy Type: None.  Laceration: 1st Degree.  Placenta: 

Intact, Spontaneous.  Cord: 3 Vessels.  Estimated Blood Loss: 100.  [place]: 

Bulb Syringe, Stimulated, Warmed, [place] Used, Warmer Used.  Delivery Comments 

(Free Text/Narrative)::  Patient found to be complete and began pushing.  With 

maternal pushing effort fetal head delivered from KEO presentation.  Nuchal 

cord present and reduced.  With gentle downward traction the fetal shoulders 

and body delivered.  Infant placed on maternal abdomen.  Cord clamped and cut.  

Cord blood obtained.  Placenta allowed time to separate and expelled intact.  

Inspection of the perineum showed a 1st degree laceration which was hemostatic 

and so not repaired.


Diagnosis: Stroke: No





- Discharge Data


Discharge Date: 19


Discharge Disposition: Home, Self-Care 01


Condition: Good





- Referral to Home Health


Primary Care Physician: 


Denisha Osei MD








- Discharge Diagnosis/Problem(s)


(1) 40 weeks gestation of pregnancy


SNOMED Code(s): 82759964


   ICD Code: Z3A.40 - 40 WEEKS GESTATION OF PREGNANCY   Status: Acute   Current 

Visit: Yes   





(2) History of 


SNOMED Code(s): 235431890


   ICD Code: Z98.891 - HISTORY OF UTERINE SCAR FROM PREVIOUS SURGERY   Status: 

Acute   Current Visit: No   





(3) History of 


SNOMED Code(s): 961991738, 794868403


   ICD Code: Z98.891 - HISTORY OF UTERINE SCAR FROM PREVIOUS SURGERY   Status: 

Acute   Current Visit: No   





(4) , delivered, current hospitalization


SNOMED Code(s): 060727400


   ICD Code: O34.219 - MATERNAL CARE FOR UNSP TYPE SCAR FROM PREVIOUS  

DEL   Status: Acute   Current Visit: No   





- Patient Summary/Data


Complications: None


Consults: 





None


Hospital Course: 





July Jon was admitted for active labor.  On admission her cervix was 

dilated to 5 cm.  She was GBS negative.  She progressed to complete and began 

pushing.  On 2019 she had a vaginal delivery after  section of a 

live male infant at 07:08.  Apgars of 8 and 9.  Weight of 4160 g (9 pounds 2.7 

ounces).  Her postpartum course was uneventful.  Her pain was well controlled 

and she had minimal lochia.  She was ambulating, tolerating a regular diet and 

voiding normally.  She was breast-feeding with minimal difficulty.  She was 

afebrile and her hematocrit was 13.1 on admission.  She desired to be 

discharged home on the morning of PPD #1.  Her blood type is AB+.  





- Patient Instructions


Diet: Regular Diet as Tolerated


Activity: Apply Ice, As Tolerated


Activity, Other: Nothing in the vagina for 6 weeks


Driving: May Drive Today


Showering/Bathing: May Shower


Notify Provider of: Fever, Increased Pain, Swelling and Redness, Drainage, 

Nausea and/or Vomiting


Other/Special Instructions: Please contact your physician's office if you have 

heavy vaginal bleeding enough to soak a pad in less than an hour for several 

hours.  Monitor for any signs of an infection in the breasts with severe pain 

or redness of the breast.





- Discharge Plan


*PRESCRIPTION DRUG MONITORING PROGRAM REVIEWED*: Not Applicable


*COPY OF PRESCRIPTION DRUG MONITORING REPORT IN PATIENT [place]: Not Applicable


Home Medications: 


 Home Meds





PNV95/Ferrous Fumarate/FA [Prenatal Tablet] 1 tab PO DAILY 19 [History]


Acetaminophen [Tylenol] 650 mg PO Q4H PRN  tablet 19 [Rx]


Benzocaine/Menthol [Dermoplast Pain Relief Spray] 1 spray TOP ASDIRECTED PRN  

canister 19 [Rx]


Docusate Sodium [Colace] 100 mg PO BID PRN  cap 19 [Rx]


Ibuprofen [Motrin] 600 mg PO Q6H PRN  tablet 19 [Rx]


Lanolin [Lansinoh HPA] 1 applic TOP ASDIRECTED PRN  tube 19 [Rx]


Witch Hazel [Tucks] 1 pad TOP ASDIRECTED PRN  pad 19 [Rx]








Patient Handouts:  Vaginal Delivery, Care After


Referrals: 


Denisha Osei MD [Primary Care Provider] -  (Follow-up in 3-6 weeks for 

routine postpartum appointment or earlier as needed.)





- Discharge Summary/Plan Comment


DC Time >30 min.: No





- Patient Data


Vitals - Most Recent: 


 Last Vital Signs











Temp  36.3 C   19 09:05


 


Pulse  92   19 09:05


 


Resp  17   19 09:05


 


BP  129/78   19 09:05


 


Pulse Ox  99   19 09:05











Weight - Most Recent: 106.594 kg


I&O - Last 24 hours: 


 Intake & Output











 19





 22:59 06:59 14:59


 


Intake Total 440  


 


Balance 440  











Lab Results - Last 24 hrs: 


 Laboratory Results - last 24 hr











  19 Range/Units





  06:35 


 


RPR  Non-reactive  (NONREACTIVE)  











Med Orders - Current: 


 Current Medications





Acetaminophen (Tylenol)  650 mg PO Q4H PRN


   PRN Reason: mild pain or fever


Benzocaine/Menthol (Dermoplast Pain Relief Spray)  0 gm TOP ASDIRECTED PRN


   PRN Reason: Perineal Comfort Measure


Docusate Sodium (Colace)  100 mg PO BID PRN


   PRN Reason: Constipation


Emollient Ointment (Lansinoh Hpa)  0 gm TOP ASDIRECTED PRN


   PRN Reason: Sore Nipples


Ibuprofen (Motrin)  600 mg PO Q6H PRN


   PRN Reason: Mild pain or fever


   Last Admin: 19 07:21 Dose:  600 mg


Witch Hazel (Tucks)  1 pad TOP ASDIRECTED PRN


   PRN Reason: Perineal Comfort Measure





Discontinued Medications





Ephedrine Sulfate (Ephedrine Sulfate)  5 mg IVPUSH ASDIRECTED PRN


   PRN Reason: Hypotension


Fentanyl (Sublimaze)  100 mcg EPIDUR Q3H PRN


   PRN Reason: Pain


Fentanyl/Bupivacaine HCl (Fentanyl/Bupivacaine/Ns 2 Mcg-0.125% 100 Ml)  100 ml 

EPIDUR ASDIRECTED GENESIS


Lactated Ringer's (Ringers, Lactated)  1,000 mls @ 100 mls/hr IV ASDIRECTED GENESIS


   Last Admin: 19 06:30 Dose:  900 mls/hr


Oxytocin/Lactated Ringer's (Pitocin In Lr 10 Units/1,000 Ml)  10 unit in 1,000 

mls @ 500 mls/hr IV .CONTINUOUS GENESIS; Protocol


   Last Admin: 19 07:13 Dose:  500 mls/hr


Nalbuphine HCl (Nubain)  10 mg IVPUSH Q2H PRN


   PRN Reason: Pain


Ondansetron HCl (Zofran)  4 mg IVPUSH ONETIME PRN


   PRN Reason: Nausea/Vomiting


Sodium Chloride (Saline Flush)  10 ml FLUSH ASDIRECTED PRN


   PRN Reason: Keep Vein Open

## 2019-09-14 NOTE — PCM.SN
- Free Text/Narrative


Note: 





Post Partum Progress Note


PPD # 1





Subjective:


Doing well overall.  Ambulating without difficulty.  Lochia minimal.  Voiding 

without difficulty.  Tolerating regular diet without nausea or vomiting.  Pain 

controlled with oral medications.  Reports that she does have increased amount 

of cramping with breast-feeding.  Breast-feeding with minimal difficulty.





Objective: 


Vitals:


 Vital Signs - 24 hr











  19





  12:46 12:55 19:52


 


Temperature   


 


Temperature [  36.5 C 





Temporal]   


 


Pulse, 84  72





Peripheral   


 


Respiratory   20





Rate   


 


Blood Pressure 114/70  111/68


 


O2 Sat by Pulse   99





Oximetry   














  19





  01:05 09:05


 


Temperature 36.3 C 36.3 C


 


Temperature [  





Temporal]  


 


Pulse, 78 92





Peripheral  


 


Respiratory 12 17





Rate  


 


Blood Pressure 117/60 129/78


 


O2 Sat by Pulse 99 99





Oximetry  














Physical Exam


General: Alert and oriented, no acute distress


Lungs: Clear to auscultation bilaterally


Heart: Regular rate and rhythm


Abdomen: Soft, minimal appropriate tenderness, non-distended, fundus midline, 

nontender, and at the umbilicus


Extremities: No edema





 Laboratory Tests











  19 Range/Units





  06:35 06:35 


 


WBC   13.86 H  (3.98-10.04)  K/mm3


 


RBC   4.75  (3.98-5.22)  M/mm3


 


Hgb   13.1  (11.2-15.7)  gm/L


 


Hct   40.0  (34.1-44.9)  %


 


MCV   84.2  (79.4-94.8)  fl


 


MCH   27.6  (25.6-32.2)  pg


 


MCHC   32.8  (32.2-35.5)  g/dl


 


RDW Std Deviation   45.0  (36.4-46.3)  fL


 


Plt Count   215  (182-369)  K/mm3


 


MPV   11.6  (9.4-12.3)  fl


 


Neut % (Auto)   79.6 H  (34.0-71.1)  %


 


Lymph % (Auto)   13.3 L  (19.3-51.7)  %


 


Mono % (Auto)   5.6  (4.7-12.5)  %


 


Eos % (Auto)   0.9  (0.7-5.8)  


 


Baso % (Auto)   0.1  (0.1-1.2)  %


 


Neut # (Auto)   11.02 H  (1.56-6.13)  K/mm3


 


Lymph # (Auto)   1.85  (1.18-3.74)  K/mm3


 


Mono # (Auto)   0.77 H  (0.24-0.36)  K/mm3


 


Eos # (Auto)   0.13  (0.04-0.36)  K/mm3


 


Baso # (Auto)   0.02  (0.01-0.08)  K/mm3


 


RPR  Non-reactive   (NONREACTIVE)  














ASSESSMENT: 


29-year-old female  s/p vaginal delivery after  section PPD #1, 

complicated by history of  section and history of successful  2





PLAN: 


Doing well


Breast-feeding with minimal difficulty. Assist as needed


Lochia minimal. Continue to monitor for appropriate lochia.


Continue routine postpartum care


Anticipate discharge home today








Jarrod Guerrero MD


9:53 AM


2019